# Patient Record
Sex: FEMALE | Race: WHITE | NOT HISPANIC OR LATINO
[De-identification: names, ages, dates, MRNs, and addresses within clinical notes are randomized per-mention and may not be internally consistent; named-entity substitution may affect disease eponyms.]

---

## 2022-01-07 ENCOUNTER — APPOINTMENT (OUTPATIENT)
Dept: UROLOGY | Facility: CLINIC | Age: 57
End: 2022-01-07
Payer: COMMERCIAL

## 2022-01-07 VITALS
HEART RATE: 93 BPM | SYSTOLIC BLOOD PRESSURE: 106 MMHG | BODY MASS INDEX: 27.49 KG/M2 | HEIGHT: 65.35 IN | OXYGEN SATURATION: 94 % | TEMPERATURE: 97.7 F | RESPIRATION RATE: 18 BRPM | WEIGHT: 167 LBS | DIASTOLIC BLOOD PRESSURE: 71 MMHG

## 2022-01-07 DIAGNOSIS — Z80.0 FAMILY HISTORY OF MALIGNANT NEOPLASM OF DIGESTIVE ORGANS: ICD-10-CM

## 2022-01-07 DIAGNOSIS — Z00.00 ENCOUNTER FOR GENERAL ADULT MEDICAL EXAMINATION W/OUT ABNORMAL FINDINGS: ICD-10-CM

## 2022-01-07 DIAGNOSIS — Z78.9 OTHER SPECIFIED HEALTH STATUS: ICD-10-CM

## 2022-01-07 DIAGNOSIS — R10.9 UNSPECIFIED ABDOMINAL PAIN: ICD-10-CM

## 2022-01-07 DIAGNOSIS — R31.21 ASYMPTOMATIC MICROSCOPIC HEMATURIA: ICD-10-CM

## 2022-01-07 PROCEDURE — 99204 OFFICE O/P NEW MOD 45 MIN: CPT

## 2022-01-07 PROCEDURE — 76775 US EXAM ABDO BACK WALL LIM: CPT

## 2022-01-07 NOTE — PHYSICAL EXAM
[General Appearance - Well Developed] : well developed [General Appearance - Well Nourished] : well nourished [Normal Appearance] : normal appearance [Well Groomed] : well groomed [General Appearance - In No Acute Distress] : no acute distress [Edema] : no peripheral edema [Exaggerated Use Of Accessory Muscles For Inspiration] : no accessory muscle use [Respiration, Rhythm And Depth] : normal respiratory rhythm and effort [Abdomen Soft] : soft [Abdomen Tenderness] : non-tender [Costovertebral Angle Tenderness] : no ~M costovertebral angle tenderness [Urinary Bladder Findings] : the bladder was normal on palpation [Normal Station and Gait] : the gait and station were normal for the patient's age [] : no rash [No Focal Deficits] : no focal deficits [Oriented To Time, Place, And Person] : oriented to person, place, and time [Affect] : the affect was normal [Mood] : the mood was normal [Not Anxious] : not anxious [No Palpable Adenopathy] : no palpable adenopathy

## 2022-01-08 LAB
APPEARANCE: ABNORMAL
BACTERIA: ABNORMAL
BILIRUBIN URINE: NEGATIVE
BLOOD URINE: ABNORMAL
CALCIUM OXALATE CRYSTALS: ABNORMAL
COLOR: YELLOW
GLUCOSE QUALITATIVE U: NEGATIVE
HYALINE CASTS: 0 /LPF
KETONES URINE: NEGATIVE
LEUKOCYTE ESTERASE URINE: ABNORMAL
MICROSCOPIC-UA: NORMAL
NITRITE URINE: NEGATIVE
PH URINE: 5.5
PROTEIN URINE: NORMAL
RED BLOOD CELLS URINE: 10 /HPF
SPECIFIC GRAVITY URINE: 1.03
SQUAMOUS EPITHELIAL CELLS: 3 /HPF
URINE COMMENTS: NORMAL
UROBILINOGEN URINE: NORMAL
WHITE BLOOD CELLS URINE: 22 /HPF

## 2022-01-08 NOTE — LETTER BODY
[FreeTextEntry1] : Charley BOWIEJames Lopez DPM\par [address]\[place] [address]\par \par Dear John Tay\par \par Reason for visit: Microscopic hematuria. Left flank pain. \par \par This is a 56 year-old English-speaking woman with microscopic hematuria referred for evaluation. Her urinalysis demonstrated evidence of microscopic hematuria, 2-5 RBC/HPF and pyuria, 5-10 WBC/HPF on microscopy. She reports left flank pain. She denies any dysuria or urinary incontinence. The patient denies any aggravating or relieving factors. The patient denies any interference of function. The patient is entirely asymptomatic. All other review of systems are negative. She has colon cancer in her family medical history through her father. She has no previous surgical history. Past medical history, family history and social history were inquired and were noncontributory to current condition. The patient does not use tobacco or drink alcohol. Medications and allergies were reviewed. She has no known allergies to medication. \par \par On examination, the patient is a healthy-appearing woman in no acute distress. She is alert and oriented follows commands. She  has normal mood and affect. She is normocephalic. Neck is supple. Oral no thrush. Respirations are unlabored. Abdomen is soft and nontender. Bladder is nonpalpable. No CVA tenderness. Neurologically she is grossly intact. No peripheral edema. Skin without gross abnormality.\par \par Her urinalysis demonstrated evidence of microscopic hematuria, 2-5 RBC/HPF and pyuria, 5-10 WBC/HPF on microscopy. \par \par Assessment: Microscopic hematuria. Pyuria. Left flank pain. \par \par I counseled the patient on the various etiologies of the microscopic hematuria. I discussed the risk of occult malignancy. I recommended patient obtain urinalysis and urine cytology. Given her pyuria, the patient will also obtain urine culture to evaluate for infection.  I also recommended the patient consider undergoing cystoscopy today to evaluate for hematuria. The patient declines cystoscopy. In terms of her left flank pain, I recommended the patient undergo renal ultrasound today for further evaluation. I counseled the patient about the procedure. I answered the patient's questions. The risks and expected outcomes were discussed. The patient will follow up as directed and contact me with any questions or concerns. Thank you for the opportunity to participate in the care of Ms. LANTIGUA. I will keep you updated on her progress.\par \par Plan: Renal ultrasound. Urinalysis. Urine cytology. Urine culture. Follow up as directed. Consider cystoscopy. \par \par I personally reviewed ultrasound images with the patient today and images demonstrated a 1.0 cm nonvascular hypoechoic focus noted in the right kidney mid pole, possible a cyst. Both kidneys appear normal in size and echogenicity. No stones, solid masses or hydronephrosis visualized.

## 2022-01-08 NOTE — ADDENDUM
[FreeTextEntry1] : Entered by Jeff Parker, acting as scribe for Dr. Heriberto Crow.\par \par The documentation recorded by the scribe accurately reflects the service I personally performed and the decisions made by me.

## 2022-01-08 NOTE — HISTORY OF PRESENT ILLNESS
[FreeTextEntry1] : Please refer to URO Consult note \par \par new pt - hematuria\par declined cystoscopy\par renal us \par

## 2022-01-09 LAB — BACTERIA UR CULT: NORMAL

## 2022-01-11 LAB — URINE CYTOLOGY: NORMAL

## 2022-07-08 ENCOUNTER — APPOINTMENT (OUTPATIENT)
Dept: UROLOGY | Facility: CLINIC | Age: 57
End: 2022-07-08